# Patient Record
Sex: MALE | Race: WHITE | NOT HISPANIC OR LATINO | Employment: FULL TIME | URBAN - METROPOLITAN AREA
[De-identification: names, ages, dates, MRNs, and addresses within clinical notes are randomized per-mention and may not be internally consistent; named-entity substitution may affect disease eponyms.]

---

## 2018-07-10 ENCOUNTER — APPOINTMENT (OUTPATIENT)
Dept: RADIOLOGY | Facility: MEDICAL CENTER | Age: 43
End: 2018-07-10
Attending: EMERGENCY MEDICINE
Payer: COMMERCIAL

## 2018-07-10 ENCOUNTER — HOSPITAL ENCOUNTER (EMERGENCY)
Facility: MEDICAL CENTER | Age: 43
End: 2018-07-10
Attending: EMERGENCY MEDICINE
Payer: COMMERCIAL

## 2018-07-10 VITALS
BODY MASS INDEX: 29.84 KG/M2 | DIASTOLIC BLOOD PRESSURE: 74 MMHG | HEART RATE: 76 BPM | HEIGHT: 75 IN | OXYGEN SATURATION: 94 % | SYSTOLIC BLOOD PRESSURE: 162 MMHG | RESPIRATION RATE: 17 BRPM | WEIGHT: 240 LBS

## 2018-07-10 DIAGNOSIS — I51.7 LVH (LEFT VENTRICULAR HYPERTROPHY): ICD-10-CM

## 2018-07-10 DIAGNOSIS — R20.2 TINGLING OF FACE: ICD-10-CM

## 2018-07-10 LAB
ALBUMIN SERPL BCP-MCNC: 5.4 G/DL (ref 3.2–4.9)
ALBUMIN/GLOB SERPL: 3.2 G/DL
ALP SERPL-CCNC: 42 U/L (ref 30–99)
ALT SERPL-CCNC: 19 U/L (ref 2–50)
ANION GAP SERPL CALC-SCNC: 14 MMOL/L (ref 0–11.9)
APTT PPP: 22.6 SEC (ref 24.7–36)
AST SERPL-CCNC: 29 U/L (ref 12–45)
BASOPHILS # BLD AUTO: 0.4 % (ref 0–1.8)
BASOPHILS # BLD: 0.04 K/UL (ref 0–0.12)
BILIRUB SERPL-MCNC: 0.4 MG/DL (ref 0.1–1.5)
BUN SERPL-MCNC: 19 MG/DL (ref 8–22)
CALCIUM SERPL-MCNC: 9 MG/DL (ref 8.5–10.5)
CHLORIDE SERPL-SCNC: 104 MMOL/L (ref 96–112)
CO2 SERPL-SCNC: 19 MMOL/L (ref 20–33)
CREAT SERPL-MCNC: 1.3 MG/DL (ref 0.5–1.4)
EKG IMPRESSION: NORMAL
EOSINOPHIL # BLD AUTO: 0.09 K/UL (ref 0–0.51)
EOSINOPHIL NFR BLD: 1 % (ref 0–6.9)
ERYTHROCYTE [DISTWIDTH] IN BLOOD BY AUTOMATED COUNT: 42.3 FL (ref 35.9–50)
GLOBULIN SER CALC-MCNC: 1.7 G/DL (ref 1.9–3.5)
GLUCOSE SERPL-MCNC: 141 MG/DL (ref 65–99)
HCT VFR BLD AUTO: 45 % (ref 42–52)
HGB BLD-MCNC: 15.5 G/DL (ref 14–18)
IMM GRANULOCYTES # BLD AUTO: 0.06 K/UL (ref 0–0.11)
IMM GRANULOCYTES NFR BLD AUTO: 0.7 % (ref 0–0.9)
INR PPP: 0.94 (ref 0.87–1.13)
LYMPHOCYTES # BLD AUTO: 3.65 K/UL (ref 1–4.8)
LYMPHOCYTES NFR BLD: 40.9 % (ref 22–41)
MCH RBC QN AUTO: 32 PG (ref 27–33)
MCHC RBC AUTO-ENTMCNC: 34.4 G/DL (ref 33.7–35.3)
MCV RBC AUTO: 93 FL (ref 81.4–97.8)
MONOCYTES # BLD AUTO: 0.8 K/UL (ref 0–0.85)
MONOCYTES NFR BLD AUTO: 9 % (ref 0–13.4)
NEUTROPHILS # BLD AUTO: 4.28 K/UL (ref 1.82–7.42)
NEUTROPHILS NFR BLD: 48 % (ref 44–72)
NRBC # BLD AUTO: 0 K/UL
NRBC BLD-RTO: 0 /100 WBC
PLATELET # BLD AUTO: 232 K/UL (ref 164–446)
PMV BLD AUTO: 10.6 FL (ref 9–12.9)
POTASSIUM SERPL-SCNC: 3.5 MMOL/L (ref 3.6–5.5)
PROT SERPL-MCNC: 7.1 G/DL (ref 6–8.2)
PROTHROMBIN TIME: 12.3 SEC (ref 12–14.6)
RBC # BLD AUTO: 4.84 M/UL (ref 4.7–6.1)
SODIUM SERPL-SCNC: 137 MMOL/L (ref 135–145)
TROPONIN I SERPL-MCNC: <0.01 NG/ML (ref 0–0.04)
TSH SERPL DL<=0.005 MIU/L-ACNC: 1.59 UIU/ML (ref 0.38–5.33)
WBC # BLD AUTO: 8.9 K/UL (ref 4.8–10.8)

## 2018-07-10 PROCEDURE — 84484 ASSAY OF TROPONIN QUANT: CPT

## 2018-07-10 PROCEDURE — 700105 HCHG RX REV CODE 258: Performed by: EMERGENCY MEDICINE

## 2018-07-10 PROCEDURE — 85730 THROMBOPLASTIN TIME PARTIAL: CPT

## 2018-07-10 PROCEDURE — 74175 CTA ABDOMEN W/CONTRAST: CPT

## 2018-07-10 PROCEDURE — 700117 HCHG RX CONTRAST REV CODE 255: Performed by: EMERGENCY MEDICINE

## 2018-07-10 PROCEDURE — 85025 COMPLETE CBC W/AUTO DIFF WBC: CPT

## 2018-07-10 PROCEDURE — 99284 EMERGENCY DEPT VISIT MOD MDM: CPT

## 2018-07-10 PROCEDURE — 84443 ASSAY THYROID STIM HORMONE: CPT

## 2018-07-10 PROCEDURE — 93005 ELECTROCARDIOGRAM TRACING: CPT

## 2018-07-10 PROCEDURE — 85610 PROTHROMBIN TIME: CPT

## 2018-07-10 PROCEDURE — 80053 COMPREHEN METABOLIC PANEL: CPT

## 2018-07-10 PROCEDURE — 93005 ELECTROCARDIOGRAM TRACING: CPT | Performed by: EMERGENCY MEDICINE

## 2018-07-10 PROCEDURE — 96360 HYDRATION IV INFUSION INIT: CPT

## 2018-07-10 RX ORDER — SODIUM CHLORIDE 9 MG/ML
1000 INJECTION, SOLUTION INTRAVENOUS ONCE
Status: COMPLETED | OUTPATIENT
Start: 2018-07-10 | End: 2018-07-10

## 2018-07-10 RX ADMIN — SODIUM CHLORIDE 1000 ML: 9 INJECTION, SOLUTION INTRAVENOUS at 16:34

## 2018-07-10 RX ADMIN — IOHEXOL 100 ML: 350 INJECTION, SOLUTION INTRAVENOUS at 17:12

## 2018-07-10 NOTE — ED NOTES
Pt very anxious about his situation, reporting he doesn't feel safe alone, and asking for someone to sit with him.

## 2018-07-10 NOTE — ED NOTES
Pt reporting he is too stressed/unable to call his coworker to notify them that he wont be able to make it to an important meeting. RN offered and completed notification per pt request. Pt further began requesting staff to make multiple other calls. RN retrieved/ ensured pt had a phone and encouraged him to make those calls himself.

## 2018-07-10 NOTE — ED NOTES
Rounded on pt, answered questions, addressed needs, ensured call light within reach. Provided emotional support for pt.

## 2018-07-10 NOTE — ED NOTES
Chief Complaint   Patient presents with   • Dizziness   • Palpitations   • Tingling     left face and arm     Pt bib ems with above complaints. Pt said he has been having multiple symptoms in past 2-3wks. He was also diagnosed with high cholesterol but had an allergic reaction with both crestor and lipitor.  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 He is scheduled for echocardiogram and ct of chest as well as neurology consult for his symptoms.

## 2018-07-10 NOTE — ED PROVIDER NOTES
"ED Provider Note    CHIEF COMPLAINT  Chief Complaint   Patient presents with   • Dizziness   • Palpitations   • Tingling     left face and arm on/off  2 months       HPI  Ric Whalen is a 43 y.o. male who presents for evaluation of intermittent tingling in the left side of his face and left arm over the past couple months but increasing in frequency and severity, he also describes episodes of palpitations and lightheadedness associated with this.  He has a history of an aortic arch repair 4 years ago.  He denies any weakness or numbness in any of his extremities, he has no neck pain or chest pain or abdominal pain.  No vomiting but does describe some nausea.  No fever.  He has an echocardiogram scheduled and is supposed to be scheduling an appointment with a neurologist but is yet to do so.  He is visiting here from [facility].  He states he was diagnosed with high cholesterol, week ago began taking medication but had an unpleasant reaction, he takes no medications on a regular basis otherwise    REVIEW OF SYSTEMS  Negative for fever, rash, chest pain, abdominal pain, vomiting, diarrhea, headache, focal weakness, focal numbness, back pain. All other systems are negative.     PAST MEDICAL HISTORY  No past medical history on file.    FAMILY HISTORY  No family history on file.    SOCIAL HISTORY  Social History   Substance Use Topics   • Smoking status: Not on file   • Smokeless tobacco: Not on file   • Alcohol use Not on file       SURGICAL HISTORY  No past surgical history on file.    CURRENT MEDICATIONS  I personally reviewed the medication list in the charting documentation.     ALLERGIES  Allergies   Allergen Reactions   • Crestor [Rosuvastatin Calcium]    • Lipitor [Atorvastatin]        MEDICAL RECORD  I have reviewed patient's medical record and pertinent results are listed above.      PHYSICAL EXAM  VITAL SIGNS: BP (!) 162/74   Pulse (!) 103   Resp 15   Ht 1.905 m (6' 3\")   Wt 108.9 kg (240 lb)   " SpO2 98%   BMI 30.00 kg/m²    Constitutional: Anxious appearing otherwise no acute distress and nontoxic  HENT: Mucus membranes dry.    Eyes: No scleral icterus. Normal conjunctiva   Neck: Supple, comfortable, nonpainful range of motion.   Cardiovascular: Regular heart rate and rhythm.   Thorax & Lungs: Chest is nontender.  Lungs are clear to auscultation with good air movement bilaterally.  No wheeze, rhonchi, nor rales.   Abdomen: Soft, with no tenderness, rebound nor guarding.  No mass or pulsatile mass appreciated.  Skin: Warm, dry. No rash appreciated  Extremities/Musculoskeletal: No sign of trauma. No asymmetric calf tenderness, erythema or edema. Normal range of motion   Neurologic: AAOx4, Cranial nerves II-XII grossly intact, PERRLA, EOMI, speech is normal, normal and symmetric motor and sensory functions upper and lower extremities bilaterally.  Psychiatric: Anxious    DIAGNOSTIC STUDIES / PROCEDURES    EKG  12 Lead EKG interpreted by me to show:    Rate 97  Rhythm: Normal sinus rhythm  Axis: Normal  AL and QRS Intervals: Normal  T waves: No acute changes  ST segments: Nonspecific morphology lead II, V3, V4 and V5, depression  Ectopy: None.    My impression of this EKG: Likely related to LVH      LABS  Results for orders placed or performed during the hospital encounter of 07/10/18   CBC w/ Differential   Result Value Ref Range    WBC 8.9 4.8 - 10.8 K/uL    RBC 4.84 4.70 - 6.10 M/uL    Hemoglobin 15.5 14.0 - 18.0 g/dL    Hematocrit 45.0 42.0 - 52.0 %    MCV 93.0 81.4 - 97.8 fL    MCH 32.0 27.0 - 33.0 pg    MCHC 34.4 33.7 - 35.3 g/dL    RDW 42.3 35.9 - 50.0 fL    Platelet Count 232 164 - 446 K/uL    MPV 10.6 9.0 - 12.9 fL    Neutrophils-Polys 48.00 44.00 - 72.00 %    Lymphocytes 40.90 22.00 - 41.00 %    Monocytes 9.00 0.00 - 13.40 %    Eosinophils 1.00 0.00 - 6.90 %    Basophils 0.40 0.00 - 1.80 %    Immature Granulocytes 0.70 0.00 - 0.90 %    Nucleated RBC 0.00 /100 WBC    Neutrophils (Absolute) 4.28 1.82  - 7.42 K/uL    Lymphs (Absolute) 3.65 1.00 - 4.80 K/uL    Monos (Absolute) 0.80 0.00 - 0.85 K/uL    Eos (Absolute) 0.09 0.00 - 0.51 K/uL    Baso (Absolute) 0.04 0.00 - 0.12 K/uL    Immature Granulocytes (abs) 0.06 0.00 - 0.11 K/uL    NRBC (Absolute) 0.00 K/uL   Complete Metabolic Panel (CMP)   Result Value Ref Range    Sodium 137 135 - 145 mmol/L    Potassium 3.5 (L) 3.6 - 5.5 mmol/L    Chloride 104 96 - 112 mmol/L    Co2 19 (L) 20 - 33 mmol/L    Anion Gap 14.0 (H) 0.0 - 11.9    Glucose 141 (H) 65 - 99 mg/dL    Bun 19 8 - 22 mg/dL    Creatinine 1.30 0.50 - 1.40 mg/dL    Calcium 9.0 8.5 - 10.5 mg/dL    AST(SGOT) 29 12 - 45 U/L    ALT(SGPT) 19 2 - 50 U/L    Alkaline Phosphatase 42 30 - 99 U/L    Total Bilirubin 0.4 0.1 - 1.5 mg/dL    Albumin 5.4 (H) 3.2 - 4.9 g/dL    Total Protein 7.1 6.0 - 8.2 g/dL    Globulin 1.7 (L) 1.9 - 3.5 g/dL    A-G Ratio 3.2 g/dL   Troponin STAT   Result Value Ref Range    Troponin I <0.01 0.00 - 0.04 ng/mL   PT/INR   Result Value Ref Range    PT 12.3 12.0 - 14.6 sec    INR 0.94 0.87 - 1.13   APTT   Result Value Ref Range    APTT 22.6 (L) 24.7 - 36.0 sec   TSH (for screening thyroid dysfunction)   Result Value Ref Range    TSH 1.590 0.380 - 5.330 uIU/mL   ESTIMATED GFR   Result Value Ref Range    GFR If African American >60 >60 mL/min/1.73 m 2    GFR If Non African American >60 >60 mL/min/1.73 m 2   EKG (ER)   Result Value Ref Range    Report       [facility] Emergency Dept.    Test Date:  2018-07-10  Pt Name:    NASRA FRENCH                  Department: ER  MRN:        [id number]                      Room:       [facility]  Gender:     Male                         Technician: [id number]  :        1975                   Requested By:ER TRIAGE PROTOCOL  Order #:    [id number]                    Reading MD:    Measurements  Intervals                                Axis  Rate:       97                           P:          53  HI:         132                          QRS:         4  QRSD:       100                          T:  QT:         368  QTc:        468    Interpretive Statements  SINUS RHYTHM  PROBABLE LEFT ATRIAL ABNORMALITY  RSR' IN V1 OR V2, RIGHT VCD OR RVH  REPOL ABNRM SUGGESTS ISCHEMIA, DIFFUSE LEADS  No previous ECG available for comparison          RADIOLOGY  CT-CTA COMPLETE THORACOABDOMINAL AORTA   Final Result      1.  No evidence of thoracic or abdominal aortic aneurysm or dissection.      2.  Right-sided aortic arch, a normal variant.      3.  No acute inflammatory process identified in the chest or abdomen.               COURSE & MEDICAL DECISION MAKING  I have reviewed any medical record information, laboratory studies and radiographic results as noted above.  Differential diagnoses includes: Aortic injury, ACS, dehydration, electrolyte abnormality, anemia, thyroid disorder, anxiety reaction    Encounter Summary: This is a 43 y.o. male with episodes of left-sided facial tingling with left arm tingling over the past couple months, becoming progressively severe, history of an aortic arch repair, he has no chest pain, no weakness or numbness.  The patient is anxious appearing on exam and he also seems dehydrated.  His EKG is concerning for ischemia although I have no old for comparison.  The patient takes no meds on a regular basis, diagnosed only with high cholesterol.  Will check troponin and other blood work, a CT of his aorta given his vague neurologic complaints associated with palpitations and his history of aortic arch repair.  Will administer IV fluids as he looks dehydrated and then will reevaluate ------ after the IV fluids, the patient informs me that he is feeling improved, his heart rate has improved, his blood work is largely unremarkable except for a minimal decrease in the CO2 and elevation in the anion gap which I think could be related to dehydration.  The CT of his chest reveals no acute abnormalities, at this time I do not feel as though emergent  "neurologic consultation is needed, the patient has already been informed of the need of neurology evaluation and, although he has yet to make an appointment, he expresses understanding of the need to have this evaluation performed.  The patient's EKG is abnormal and I discussed this with our on-call cardiologist Dr. Wills who suspects is all related to LVH and recommends an outpatient echocardiogram which the patient Colby has scheduled for Friday.  At this point strict return instructions have been discussed, he is stable and appropriate for discharge  BP (!) 162/74   Pulse 76   Resp 17   Ht 1.905 m (6' 3\")   Wt 108.9 kg (240 lb)   SpO2 94%   BMI 30.00 kg/m²         DISPOSITION: Discharged home in stable condition      FINAL IMPRESSION  1. Tingling of face    2. LVH (left ventricular hypertrophy)           This dictation was created using voice recognition software. The accuracy of the dictation is limited to the abilities of the software. I expect there may be some errors of grammar and possibly content. The nursing notes were reviewed and certain aspects of this information were incorporated into this note.    Electronically signed by: Gopal López, 7/10/2018 4:05 PM      "

## 2018-07-11 ENCOUNTER — APPOINTMENT (OUTPATIENT)
Dept: RADIOLOGY | Facility: MEDICAL CENTER | Age: 43
End: 2018-07-11
Attending: INTERNAL MEDICINE
Payer: COMMERCIAL

## 2018-07-11 ENCOUNTER — APPOINTMENT (OUTPATIENT)
Dept: RADIOLOGY | Facility: MEDICAL CENTER | Age: 43
End: 2018-07-11
Attending: EMERGENCY MEDICINE
Payer: COMMERCIAL

## 2018-07-11 ENCOUNTER — HOSPITAL ENCOUNTER (OUTPATIENT)
Facility: MEDICAL CENTER | Age: 43
End: 2018-07-12
Attending: EMERGENCY MEDICINE | Admitting: INTERNAL MEDICINE
Payer: COMMERCIAL

## 2018-07-11 DIAGNOSIS — G45.9 TRANSIENT CEREBRAL ISCHEMIA, UNSPECIFIED TYPE: ICD-10-CM

## 2018-07-11 PROBLEM — R07.9 CHEST PAIN: Status: ACTIVE | Noted: 2018-07-11

## 2018-07-11 PROBLEM — R29.810 FACIAL WEAKNESS: Status: ACTIVE | Noted: 2018-07-11

## 2018-07-11 LAB
ALBUMIN SERPL BCP-MCNC: 4.4 G/DL (ref 3.2–4.9)
ALBUMIN/GLOB SERPL: 1.5 G/DL
ALP SERPL-CCNC: 43 U/L (ref 30–99)
ALT SERPL-CCNC: 21 U/L (ref 2–50)
ANION GAP SERPL CALC-SCNC: 7 MMOL/L (ref 0–11.9)
APPEARANCE UR: CLEAR
AST SERPL-CCNC: 21 U/L (ref 12–45)
BASOPHILS # BLD AUTO: 0.5 % (ref 0–1.8)
BASOPHILS # BLD: 0.03 K/UL (ref 0–0.12)
BILIRUB SERPL-MCNC: 0.4 MG/DL (ref 0.1–1.5)
BILIRUB UR QL STRIP.AUTO: NEGATIVE
BUN SERPL-MCNC: 15 MG/DL (ref 8–22)
CALCIUM SERPL-MCNC: 9.4 MG/DL (ref 8.5–10.5)
CHLORIDE SERPL-SCNC: 108 MMOL/L (ref 96–112)
CO2 SERPL-SCNC: 26 MMOL/L (ref 20–33)
COLOR UR: YELLOW
CREAT SERPL-MCNC: 1.13 MG/DL (ref 0.5–1.4)
EKG IMPRESSION: NORMAL
EOSINOPHIL # BLD AUTO: 0.01 K/UL (ref 0–0.51)
EOSINOPHIL NFR BLD: 0.2 % (ref 0–6.9)
ERYTHROCYTE [DISTWIDTH] IN BLOOD BY AUTOMATED COUNT: 42.5 FL (ref 35.9–50)
EST. AVERAGE GLUCOSE BLD GHB EST-MCNC: 114 MG/DL
GLOBULIN SER CALC-MCNC: 2.9 G/DL (ref 1.9–3.5)
GLUCOSE SERPL-MCNC: 98 MG/DL (ref 65–99)
GLUCOSE UR STRIP.AUTO-MCNC: NEGATIVE MG/DL
HBA1C MFR BLD: 5.6 % (ref 0–5.6)
HCT VFR BLD AUTO: 42.8 % (ref 42–52)
HGB BLD-MCNC: 14.7 G/DL (ref 14–18)
IMM GRANULOCYTES # BLD AUTO: 0.01 K/UL (ref 0–0.11)
IMM GRANULOCYTES NFR BLD AUTO: 0.2 % (ref 0–0.9)
KETONES UR STRIP.AUTO-MCNC: NEGATIVE MG/DL
LEUKOCYTE ESTERASE UR QL STRIP.AUTO: NEGATIVE
LYMPHOCYTES # BLD AUTO: 1.69 K/UL (ref 1–4.8)
LYMPHOCYTES NFR BLD: 26.6 % (ref 22–41)
MCH RBC QN AUTO: 31.7 PG (ref 27–33)
MCHC RBC AUTO-ENTMCNC: 34.3 G/DL (ref 33.7–35.3)
MCV RBC AUTO: 92.2 FL (ref 81.4–97.8)
MICRO URNS: NORMAL
MONOCYTES # BLD AUTO: 0.33 K/UL (ref 0–0.85)
MONOCYTES NFR BLD AUTO: 5.2 % (ref 0–13.4)
NEUTROPHILS # BLD AUTO: 4.28 K/UL (ref 1.82–7.42)
NEUTROPHILS NFR BLD: 67.3 % (ref 44–72)
NITRITE UR QL STRIP.AUTO: NEGATIVE
NRBC # BLD AUTO: 0 K/UL
NRBC BLD-RTO: 0 /100 WBC
PH UR STRIP.AUTO: 8 [PH]
PLATELET # BLD AUTO: 210 K/UL (ref 164–446)
PMV BLD AUTO: 10.2 FL (ref 9–12.9)
POTASSIUM SERPL-SCNC: 4.2 MMOL/L (ref 3.6–5.5)
PROT SERPL-MCNC: 7.3 G/DL (ref 6–8.2)
PROT UR QL STRIP: NEGATIVE MG/DL
RBC # BLD AUTO: 4.64 M/UL (ref 4.7–6.1)
RBC UR QL AUTO: NEGATIVE
SODIUM SERPL-SCNC: 141 MMOL/L (ref 135–145)
SP GR UR STRIP.AUTO: 1.01
TROPONIN I SERPL-MCNC: <0.01 NG/ML (ref 0–0.04)
UROBILINOGEN UR STRIP.AUTO-MCNC: 0.2 MG/DL
WBC # BLD AUTO: 6.4 K/UL (ref 4.8–10.8)

## 2018-07-11 PROCEDURE — 700111 HCHG RX REV CODE 636 W/ 250 OVERRIDE (IP): Performed by: EMERGENCY MEDICINE

## 2018-07-11 PROCEDURE — 85025 COMPLETE CBC W/AUTO DIFF WBC: CPT

## 2018-07-11 PROCEDURE — 83036 HEMOGLOBIN GLYCOSYLATED A1C: CPT

## 2018-07-11 PROCEDURE — 84484 ASSAY OF TROPONIN QUANT: CPT

## 2018-07-11 PROCEDURE — 700102 HCHG RX REV CODE 250 W/ 637 OVERRIDE(OP): Performed by: FAMILY MEDICINE

## 2018-07-11 PROCEDURE — 71046 X-RAY EXAM CHEST 2 VIEWS: CPT

## 2018-07-11 PROCEDURE — G0378 HOSPITAL OBSERVATION PER HR: HCPCS

## 2018-07-11 PROCEDURE — 81003 URINALYSIS AUTO W/O SCOPE: CPT

## 2018-07-11 PROCEDURE — 70450 CT HEAD/BRAIN W/O DYE: CPT

## 2018-07-11 PROCEDURE — 93005 ELECTROCARDIOGRAM TRACING: CPT | Performed by: EMERGENCY MEDICINE

## 2018-07-11 PROCEDURE — 700102 HCHG RX REV CODE 250 W/ 637 OVERRIDE(OP): Performed by: INTERNAL MEDICINE

## 2018-07-11 PROCEDURE — 80053 COMPREHEN METABOLIC PANEL: CPT

## 2018-07-11 PROCEDURE — 96374 THER/PROPH/DIAG INJ IV PUSH: CPT

## 2018-07-11 PROCEDURE — 700105 HCHG RX REV CODE 258: Performed by: INTERNAL MEDICINE

## 2018-07-11 PROCEDURE — A9270 NON-COVERED ITEM OR SERVICE: HCPCS | Performed by: FAMILY MEDICINE

## 2018-07-11 PROCEDURE — 70551 MRI BRAIN STEM W/O DYE: CPT

## 2018-07-11 PROCEDURE — 99285 EMERGENCY DEPT VISIT HI MDM: CPT

## 2018-07-11 PROCEDURE — 93005 ELECTROCARDIOGRAM TRACING: CPT

## 2018-07-11 PROCEDURE — A9270 NON-COVERED ITEM OR SERVICE: HCPCS | Performed by: INTERNAL MEDICINE

## 2018-07-11 PROCEDURE — 99220 PR INITIAL OBSERVATION CARE,LEVL III: CPT | Performed by: INTERNAL MEDICINE

## 2018-07-11 RX ORDER — LORAZEPAM 1 MG/1
0.25 TABLET ORAL ONCE
Status: COMPLETED | OUTPATIENT
Start: 2018-07-11 | End: 2018-07-11

## 2018-07-11 RX ORDER — LORAZEPAM 2 MG/ML
1 INJECTION INTRAMUSCULAR ONCE
Status: COMPLETED | OUTPATIENT
Start: 2018-07-11 | End: 2018-07-11

## 2018-07-11 RX ORDER — PROMETHAZINE HYDROCHLORIDE 25 MG/1
12.5-25 TABLET ORAL EVERY 4 HOURS PRN
Status: DISCONTINUED | OUTPATIENT
Start: 2018-07-11 | End: 2018-07-12 | Stop reason: HOSPADM

## 2018-07-11 RX ORDER — PROMETHAZINE HYDROCHLORIDE 12.5 MG/1
12.5-25 SUPPOSITORY RECTAL EVERY 4 HOURS PRN
Status: DISCONTINUED | OUTPATIENT
Start: 2018-07-11 | End: 2018-07-12 | Stop reason: HOSPADM

## 2018-07-11 RX ORDER — ONDANSETRON 2 MG/ML
4 INJECTION INTRAMUSCULAR; INTRAVENOUS EVERY 4 HOURS PRN
Status: DISCONTINUED | OUTPATIENT
Start: 2018-07-11 | End: 2018-07-12 | Stop reason: HOSPADM

## 2018-07-11 RX ORDER — ASPIRIN 325 MG
325 TABLET ORAL DAILY
Status: DISCONTINUED | OUTPATIENT
Start: 2018-07-11 | End: 2018-07-12 | Stop reason: HOSPADM

## 2018-07-11 RX ORDER — BISACODYL 10 MG
10 SUPPOSITORY, RECTAL RECTAL
Status: DISCONTINUED | OUTPATIENT
Start: 2018-07-11 | End: 2018-07-12 | Stop reason: HOSPADM

## 2018-07-11 RX ORDER — AZELASTINE HYDROCHLORIDE, FLUTICASONE PROPIONATE 137; 50 UG/1; UG/1
1 SPRAY, METERED NASAL DAILY
COMMUNITY

## 2018-07-11 RX ORDER — ACETAMINOPHEN 325 MG/1
650 TABLET ORAL EVERY 6 HOURS PRN
Status: DISCONTINUED | OUTPATIENT
Start: 2018-07-11 | End: 2018-07-12 | Stop reason: HOSPADM

## 2018-07-11 RX ORDER — ASPIRIN 300 MG/1
300 SUPPOSITORY RECTAL DAILY
Status: DISCONTINUED | OUTPATIENT
Start: 2018-07-11 | End: 2018-07-12 | Stop reason: HOSPADM

## 2018-07-11 RX ORDER — AMOXICILLIN 250 MG
2 CAPSULE ORAL 2 TIMES DAILY
Status: DISCONTINUED | OUTPATIENT
Start: 2018-07-11 | End: 2018-07-12 | Stop reason: HOSPADM

## 2018-07-11 RX ORDER — ASPIRIN 81 MG/1
324 TABLET, CHEWABLE ORAL DAILY
Status: DISCONTINUED | OUTPATIENT
Start: 2018-07-11 | End: 2018-07-12 | Stop reason: HOSPADM

## 2018-07-11 RX ORDER — POLYETHYLENE GLYCOL 3350 17 G/17G
1 POWDER, FOR SOLUTION ORAL
Status: DISCONTINUED | OUTPATIENT
Start: 2018-07-11 | End: 2018-07-12 | Stop reason: HOSPADM

## 2018-07-11 RX ORDER — ONDANSETRON 4 MG/1
4 TABLET, ORALLY DISINTEGRATING ORAL EVERY 4 HOURS PRN
Status: DISCONTINUED | OUTPATIENT
Start: 2018-07-11 | End: 2018-07-12 | Stop reason: HOSPADM

## 2018-07-11 RX ORDER — SODIUM CHLORIDE 9 MG/ML
INJECTION, SOLUTION INTRAVENOUS CONTINUOUS
Status: DISCONTINUED | OUTPATIENT
Start: 2018-07-11 | End: 2018-07-12 | Stop reason: HOSPADM

## 2018-07-11 RX ADMIN — LORAZEPAM 1 MG: 2 INJECTION INTRAMUSCULAR; INTRAVENOUS at 13:15

## 2018-07-11 RX ADMIN — SODIUM CHLORIDE: 9 INJECTION, SOLUTION INTRAVENOUS at 15:15

## 2018-07-11 RX ADMIN — SODIUM CHLORIDE: 9 INJECTION, SOLUTION INTRAVENOUS at 18:28

## 2018-07-11 RX ADMIN — ASPIRIN 325 MG: 325 TABLET ORAL at 18:29

## 2018-07-11 RX ADMIN — LORAZEPAM 0.25 MG: 1 TABLET ORAL at 23:04

## 2018-07-11 RX ADMIN — STANDARDIZED SENNA CONCENTRATE AND DOCUSATE SODIUM 2 TABLET: 8.6; 5 TABLET, FILM COATED ORAL at 18:29

## 2018-07-11 RX ADMIN — LORAZEPAM 0.25 MG: 1 TABLET ORAL at 18:29

## 2018-07-11 ASSESSMENT — ENCOUNTER SYMPTOMS
CHILLS: 0
ABDOMINAL PAIN: 0
BLURRED VISION: 0
BACK PAIN: 0
MYALGIAS: 0
PALPITATIONS: 0
HEADACHES: 0
EYE DISCHARGE: 0
SHORTNESS OF BREATH: 0
DEPRESSION: 0
NAUSEA: 0
NECK PAIN: 0
COUGH: 0
TINGLING: 1
NERVOUS/ANXIOUS: 0
FEVER: 0
STRIDOR: 0
SPUTUM PRODUCTION: 0
ORTHOPNEA: 0
EYE REDNESS: 0
EYE PAIN: 0
HEARTBURN: 0
WEIGHT LOSS: 0
FOCAL WEAKNESS: 1
SEIZURES: 0
INSOMNIA: 0
VOMITING: 0
DIARRHEA: 0
DIZZINESS: 0

## 2018-07-11 ASSESSMENT — LIFESTYLE VARIABLES
HOW MANY TIMES IN THE PAST YEAR HAVE YOU HAD 5 OR MORE DRINKS IN A DAY: 0
TOTAL SCORE: 0
ALCOHOL_USE: NO
TOTAL SCORE: 0
EVER FELT BAD OR GUILTY ABOUT YOUR DRINKING: NO
HAVE YOU EVER FELT YOU SHOULD CUT DOWN ON YOUR DRINKING: NO
HAVE PEOPLE ANNOYED YOU BY CRITICIZING YOUR DRINKING: NO
TOTAL SCORE: 0
HAVE YOU EVER FELT YOU SHOULD CUT DOWN ON YOUR DRINKING: NO
CONSUMPTION TOTAL: INCOMPLETE
EVER HAD A DRINK FIRST THING IN THE MORNING TO STEADY YOUR NERVES TO GET RID OF A HANGOVER: NO
CONSUMPTION TOTAL: INCOMPLETE
HAVE PEOPLE ANNOYED YOU BY CRITICIZING YOUR DRINKING: NO
AVERAGE NUMBER OF DAYS PER WEEK YOU HAVE A DRINK CONTAINING ALCOHOL: 1
ALCOHOL_USE: YES
EVER FELT BAD OR GUILTY ABOUT YOUR DRINKING: NO
EVER_SMOKED: NEVER
TOTAL SCORE: 0
EVER HAD A DRINK FIRST THING IN THE MORNING TO STEADY YOUR NERVES TO GET RID OF A HANGOVER: NO
ON A TYPICAL DAY WHEN YOU DRINK ALCOHOL HOW MANY DRINKS DO YOU HAVE: 1
CONSUMPTION TOTAL: INCOMPLETE
TOTAL SCORE: 0
TOTAL SCORE: 0

## 2018-07-11 ASSESSMENT — PATIENT HEALTH QUESTIONNAIRE - PHQ9
2. FEELING DOWN, DEPRESSED, IRRITABLE, OR HOPELESS: NOT AT ALL
1. LITTLE INTEREST OR PLEASURE IN DOING THINGS: NOT AT ALL
SUM OF ALL RESPONSES TO PHQ9 QUESTIONS 1 AND 2: 0

## 2018-07-11 ASSESSMENT — PAIN SCALES - GENERAL
PAINLEVEL_OUTOF10: 0

## 2018-07-11 NOTE — ED PROVIDER NOTES
ED Provider Note    CHIEF COMPLAINT  Chief Complaint   Patient presents with   • Anxiety     pt. states he came in yesterday for chest pain. Pt. states yesterday he was removed from a flight for MI like symptoms. Pt. states he got on a plane again this morning and again removed himself from the plane due to similar s/s. Pt. states he feels like his heart is racing. Pt's HR is in the 90s consistently.       HPI  Ric Whalen is a 43 y.o. male here for evaluation of left-sided facial paresthesia and left arm paresthesia.  The patient states that he was here yesterday and had a workup for chest pain, which includes a CT of the chest.  He has recently been traveling on a plane, and had some chest pain yesterday.  This morning he woke up with left-sided facial numbness and arm numbness as well.  He states that this went away after couple of hours, but decided to come in here to be evaluated.  He has no trouble walking, no vomiting, and no fever.  He has no current chest pain and no shortness of breath.    PAST MEDICAL HISTORY   No diabetes  No hyperlipidemia    SOCIAL HISTORY  Social History     Social History Main Topics   • Smoking status: Never Smoker   • Smokeless tobacco: Never Used   • Alcohol use Yes   • Drug use: No   • Sexual activity: Not on file       SURGICAL HISTORY  patient denies any surgical history    CURRENT MEDICATIONS  Home Medications     Reviewed by Kelsie Lam R.N. (Registered Nurse) on 07/11/18 at 0759  Med List Status: Partial   Medication Last Dose Status        Patient José Miguel Taking any Medications                       ALLERGIES  Allergies   Allergen Reactions   • Crestor [Rosuvastatin Calcium]    • Lipitor [Atorvastatin]        REVIEW OF SYSTEMS  See HPI for further details. Review of systems as above, otherwise all other systems are negative.     PHYSICAL EXAM  Constitutional: Well developed, well nourished. No acute distress.  HEENT: Normocephalic, atraumatic. Posterior pharynx clear  and moist.  Eyes:  EOMI. Normal sclera.  Neck: Supple, Full range of motion, nontender.  Chest/Pulmonary: clear to ausculation. Symmetrical expansion.   Cardio: Regular rate and rhythm with no murmur.   Abdomen: Soft, nontender. No peritoneal signs. No guarding. No palpable masses.  Back: No CVA tenderness, nontender midline, no step offs.  Musculoskeletal: No deformity, no edema, neurovascular intact.   Neuro: Clear speech, appropriate, cooperative, cranial nerves II-XII grossly intact.  Diminished sensation to left face and left upper extremity.  Psych: Normal mood and affect    Results for orders placed or performed during the hospital encounter of 18   CBC WITH DIFFERENTIAL   Result Value Ref Range    WBC 6.4 4.8 - 10.8 K/uL    RBC 4.64 (L) 4.70 - 6.10 M/uL    Hemoglobin 14.7 14.0 - 18.0 g/dL    Hematocrit 42.8 42.0 - 52.0 %    MCV 92.2 81.4 - 97.8 fL    MCH 31.7 27.0 - 33.0 pg    MCHC 34.3 33.7 - 35.3 g/dL    RDW 42.5 35.9 - 50.0 fL    Platelet Count 210 164 - 446 K/uL    MPV 10.2 9.0 - 12.9 fL    Neutrophils-Polys 67.30 44.00 - 72.00 %    Lymphocytes 26.60 22.00 - 41.00 %    Monocytes 5.20 0.00 - 13.40 %    Eosinophils 0.20 0.00 - 6.90 %    Basophils 0.50 0.00 - 1.80 %    Immature Granulocytes 0.20 0.00 - 0.90 %    Nucleated RBC 0.00 /100 WBC    Neutrophils (Absolute) 4.28 1.82 - 7.42 K/uL    Lymphs (Absolute) 1.69 1.00 - 4.80 K/uL    Monos (Absolute) 0.33 0.00 - 0.85 K/uL    Eos (Absolute) 0.01 0.00 - 0.51 K/uL    Baso (Absolute) 0.03 0.00 - 0.12 K/uL    Immature Granulocytes (abs) 0.01 0.00 - 0.11 K/uL    NRBC (Absolute) 0.00 K/uL   EKG (NOW)   Result Value Ref Range    Report       [facility] Emergency Dept.    Test Date:  2018  Pt Name:    NASRA FRENCH                  Department: ER  MRN:        [id number]                      Room:  Gender:     Male                         Technician: [id number]  :        1975                   Requested By:ER TRIAGE PROTOCOL  Order #:     [id number]                    Reading MD:    Measurements  Intervals                                Axis  Rate:       74                           P:          62  OR:         156                          QRS:        38  QRSD:       102                          T:          -27  QT:         396  QTc:        440    Interpretive Statements  SINUS RHYTHM  PROBABLE LEFT ATRIAL ABNORMALITY  RSR' IN V1 OR V2, PROBABLY NORMAL VARIANT  LVH WITH SECONDARY REPOLARIZATION ABNORMALITY  Compared to ECG 07/10/2018 15:29:48  Left ventricular hypertrophy now present  Right ventricular hypertrophy no longer present  Possible ischemia no longer present          CT-HEAD W/O   Final Result      No acute intracranial abnormality.      DX-CHEST-2 VIEWS    (Results Pending)   MR-BRAIN-W/O    (Results Pending)         PROCEDURES     MEDICAL RECORD  I have reviewed patient's medical record and pertinent results are listed above.    COURSE & MEDICAL DECISION MAKING  I have reviewed any medical record information, laboratory studies and radiographic results as noted above.    Differential diagnoses include but not limited to: TIA, CVA, MI, anxiety    Dr. Melendez will admit to the cdu.  He will be admitted in guarded condition.     FINAL IMPRESSION  1. Transient cerebral ischemia, unspecified type      Electronically signed by: Ramy Jean, 7/11/2018 1:55 PM

## 2018-07-11 NOTE — ED TRIAGE NOTES
"Ric Whalen  43 y.o. male  Chief Complaint   Patient presents with   • Anxiety     pt. states he came in yesterday for chest pain. Pt. states yesterday he was removed from a flight for MI like symptoms. Pt. states he got on a plane again this morning and again removed himself from the plane due to similar s/s. Pt. states he feels like his heart is racing. Pt's HR is in the 90s consistently.     /90   Pulse 84   Temp 36.6 °C (97.9 °F)   Resp 18   Ht 1.905 m (6' 3\")   Wt 108.9 kg (240 lb)   SpO2 98%   BMI 30.00 kg/m²   Pt amb to triage with steady gait for above complaint. Pt. Placed into wheelchair due to repeated comments of feelings of dizziness.  Pt is alert and oriented, speaking in full sentences, follows commands and responds appropriately to questions. NAD. Resp are even and unlabored.  Pt placed in lobby. Pt educated on triage process. Pt encouraged to alert staff for any changes.  "

## 2018-07-11 NOTE — DISCHARGE PLANNING
MSW provided pt's family with lodging letter and list. MSW also faxed letter to tge [employer]. Will remain available for support.

## 2018-07-11 NOTE — DISCHARGE INSTRUCTIONS
Return immediately to the Emergency Department if you experience continuing or worsening symptoms, any new numbness/weakness/tingling, fever or any other new or worsening symptoms.       You were seen and examined today in our facility. Our caregiver found nothing wrong on the exam. If testing was done such as lab work or x-rays, they did not indicate enough wrong to suggest that treatment should be given. The caregiver then must decide after testing is finished if your concern is a physical problem or illness that needs treatment. Today no treatable problem was found. Even if reassurance was given, if you feel you are getting worse when you get home make sure you call back or return to our department.  For the protection of your privacy, test results can not be given over the phone. Make sure you receive the results of your test. Ask as to how these results are to be obtained if you have not been informed. It is your responsibility to obtain your test results.  Your condition can change over time. Sometimes it takes more than one visit to determine the cause of your symptoms. It is important that you monitor your condition for any changes.  SEEK IMMEDIATE MEDICAL CARE IF:  · You develop an oral temperature above 102° F (38.9° C), which lasts for more than 2 days, not controlled by medications. Only take over-the-counter or prescription medicines for pain, discomfort, or fever as directed by your caregiver.   · You develop a loss of appetite or start throwing up (vomiting).   · You develop a rash, cough, belly (abdominal) pain, earache, headache, or develop pain in neck, muscles, or joints.   · The problem or problems which brought you to our facility become worse or are a cause of more concern.   If we have told you today your exam and tests are normal, and a short while later you feel this is not right, please seek medical attention so you may be rechecked.  Document Released: 04/01/2002 Document Revised:  03/11/2013 Document Reviewed: 07/24/2009  [employer]® Patient Information ©2013 [employer].

## 2018-07-11 NOTE — H&P
Hospital Medicine History and Physical      Date of Service  7/11/2018    Chief Complaint  Chief Complaint   Patient presents with   • Anxiety     pt. states he came in yesterday for chest pain. Pt. states yesterday he was removed from a flight for MI like symptoms. Pt. states he got on a plane again this morning and again removed himself from the plane due to similar s/s. Pt. states he feels like his heart is racing. Pt's HR is in the 90s consistently.       History of Presenting Illness  Marlee is a 43 y.o. male no PMH, who presents with chest pain.  He has cardiac workup done in ER and it was negative.  He also had a CT angiogram of the chest done with no acute finding.  while in the ER he started having left facial weakness, tingling sensation and left upper extremity weakness and tingling sensation.  Denies any slurred speech.  Currently he only have some tingling sensation over left face.  CT scan of the head was done and it was negative.  He will be admitted for observation.    Primary Care Physician  Pcp Not In Computer      Code Status  Full code    Review of Systems  Review of Systems   Constitutional: Negative for chills, fever and weight loss.   HENT: Negative for congestion and nosebleeds.    Eyes: Negative for blurred vision, pain, discharge and redness.   Respiratory: Negative for cough, sputum production, shortness of breath and stridor.    Cardiovascular: Positive for chest pain. Negative for palpitations and orthopnea.   Gastrointestinal: Negative for abdominal pain, diarrhea, heartburn, nausea and vomiting.   Genitourinary: Negative for dysuria, frequency and urgency.   Musculoskeletal: Negative for back pain, myalgias and neck pain.   Skin: Negative for itching and rash.   Neurological: Positive for tingling and focal weakness. Negative for dizziness, seizures and headaches.   Psychiatric/Behavioral: Negative for depression. The patient is not nervous/anxious and does not have insomnia.       Please see HPI, all other systems were reviewed and are negative (AMA/CMS criteria)     Past Medical History  No pertinent past medical history    Surgical History  No pertinent past surgical history    Medications  Not taking any medication  Family History  History reviewed. No pertinent family history.      Social History  Social History   Substance Use Topics   • Smoking status: Never Smoker   • Smokeless tobacco: Never Used   • Alcohol use Yes       Allergies  Allergies   Allergen Reactions   • Crestor [Rosuvastatin Calcium]    • Lipitor [Atorvastatin]         Physical Exam  Laboratory   Hemodynamics  Temp (24hrs), Av.4 °C (97.6 °F), Min:36.3 °C (97.3 °F), Max:36.6 °C (97.9 °F)   Temperature: 36.3 °C (97.3 °F)  Pulse  Av.3  Min: 61  Max: 84 Heart Rate (Monitored): 67  Blood Pressure: 120/64, NIBP: 118/70      Respiratory      Respiration: 16, Pulse Oximetry: 99 %             Physical Exam   Constitutional: He is oriented to person, place, and time. No distress.   HENT:   Head: Normocephalic and atraumatic.   Mouth/Throat: Oropharynx is clear and moist.   Eyes: Conjunctivae and EOM are normal. Pupils are equal, round, and reactive to light.   Neck: Normal range of motion. Neck supple. No tracheal deviation present. No thyromegaly present.   Cardiovascular: Normal rate and regular rhythm.    No murmur heard.  Pulmonary/Chest: Effort normal and breath sounds normal. No respiratory distress. He has no wheezes.   Abdominal: Soft. Bowel sounds are normal. He exhibits no distension. There is no tenderness.   Musculoskeletal: He exhibits no edema or tenderness.   Neurological: He is alert and oriented to person, place, and time. No cranial nerve deficit.   Left facial tingling   Skin: Skin is warm and dry. He is not diaphoretic. No erythema.   Psychiatric: He has a normal mood and affect. His behavior is normal. Thought content normal.       Recent Labs      07/10/18   1530  18   0941   WBC  8.9  6.4    RBC  4.84  4.64*   HEMOGLOBIN  15.5  14.7   HEMATOCRIT  45.0  42.8   MCV  93.0  92.2   MCH  32.0  31.7   MCHC  34.4  34.3   RDW  42.3  42.5   PLATELETCT  232  210   MPV  10.6  10.2     Recent Labs      07/10/18   1530  07/11/18   0941   SODIUM  137  141   POTASSIUM  3.5*  4.2   CHLORIDE  104  108   CO2  19*  26   GLUCOSE  141*  98   BUN  19  15   CREATININE  1.30  1.13   CALCIUM  9.0  9.4     Recent Labs      07/10/18   1530  07/11/18   0941   ALTSGPT  19  21   ASTSGOT  29  21   ALKPHOSPHAT  42  43   TBILIRUBIN  0.4  0.4   GLUCOSE  141*  98     Recent Labs      07/10/18   1530   APTT  22.6*   INR  0.94             Lab Results   Component Value Date    TROPONINI <0.01 07/11/2018       Imaging  CT-HEAD W/O   Final Result      No acute intracranial abnormality.      DX-CHEST-2 VIEWS    (Results Pending)   MR-BRAIN-W/O    (Results Pending)     EKG  per my independant read:  QTc: 468, HR: 97, sinus tachycardia, diffuse st depression     Assessment/Plan     I anticipate this patient is appropriate for observation status at this time.    Facial weakness- (present on admission)   Assessment & Plan    Left facial weakness and tingling sensation  CT scan is negative  Get MRI        Chest pain- (present on admission)   Assessment & Plan    With EKG changes ST depression as above  We will get stress test for him            Prophylaxis:  lovenox

## 2018-07-11 NOTE — ED NOTES
Med Rec completed per patient  Allergies reviewed  No ORAL antibiotics in last 30 days    Patient stated he is allergic to Crestor and stopped 3 days ago

## 2018-07-11 NOTE — ED NOTES
Discussed Discharge instructions, F/U instructions Pt. Questions answered. Pt requests CD of imagining, ERP notified, ERP provided images as requested. Pt escorted out of ED in stable condition.

## 2018-07-12 ENCOUNTER — PATIENT OUTREACH (OUTPATIENT)
Dept: HEALTH INFORMATION MANAGEMENT | Facility: OTHER | Age: 43
End: 2018-07-12

## 2018-07-12 VITALS
SYSTOLIC BLOOD PRESSURE: 137 MMHG | HEART RATE: 62 BPM | TEMPERATURE: 97.1 F | OXYGEN SATURATION: 95 % | WEIGHT: 235.23 LBS | HEIGHT: 75 IN | RESPIRATION RATE: 16 BRPM | DIASTOLIC BLOOD PRESSURE: 65 MMHG | BODY MASS INDEX: 29.25 KG/M2

## 2018-07-12 LAB
ALBUMIN SERPL BCP-MCNC: 3.7 G/DL (ref 3.2–4.9)
ALBUMIN/GLOB SERPL: 1.3 G/DL
ALP SERPL-CCNC: 37 U/L (ref 30–99)
ALT SERPL-CCNC: 17 U/L (ref 2–50)
ANION GAP SERPL CALC-SCNC: 5 MMOL/L (ref 0–11.9)
AST SERPL-CCNC: 23 U/L (ref 12–45)
BILIRUB SERPL-MCNC: 0.3 MG/DL (ref 0.1–1.5)
BUN SERPL-MCNC: 17 MG/DL (ref 8–22)
CALCIUM SERPL-MCNC: 8.6 MG/DL (ref 8.5–10.5)
CHLORIDE SERPL-SCNC: 112 MMOL/L (ref 96–112)
CHOLEST SERPL-MCNC: 133 MG/DL (ref 100–199)
CO2 SERPL-SCNC: 22 MMOL/L (ref 20–33)
CREAT SERPL-MCNC: 1.16 MG/DL (ref 0.5–1.4)
ERYTHROCYTE [SEDIMENTATION RATE] IN BLOOD BY WESTERGREN METHOD: 4 MM/HOUR (ref 0–15)
GLOBULIN SER CALC-MCNC: 2.8 G/DL (ref 1.9–3.5)
GLUCOSE SERPL-MCNC: 108 MG/DL (ref 65–99)
HDLC SERPL-MCNC: 44 MG/DL
LDLC SERPL CALC-MCNC: 72 MG/DL
POTASSIUM SERPL-SCNC: 4.1 MMOL/L (ref 3.6–5.5)
PROT SERPL-MCNC: 6.5 G/DL (ref 6–8.2)
SODIUM SERPL-SCNC: 139 MMOL/L (ref 135–145)
TRIGL SERPL-MCNC: 85 MG/DL (ref 0–149)

## 2018-07-12 PROCEDURE — 36415 COLL VENOUS BLD VENIPUNCTURE: CPT

## 2018-07-12 PROCEDURE — G0378 HOSPITAL OBSERVATION PER HR: HCPCS

## 2018-07-12 PROCEDURE — 80053 COMPREHEN METABOLIC PANEL: CPT

## 2018-07-12 PROCEDURE — 700111 HCHG RX REV CODE 636 W/ 250 OVERRIDE (IP): Performed by: INTERNAL MEDICINE

## 2018-07-12 PROCEDURE — 96372 THER/PROPH/DIAG INJ SC/IM: CPT

## 2018-07-12 PROCEDURE — A9270 NON-COVERED ITEM OR SERVICE: HCPCS | Performed by: INTERNAL MEDICINE

## 2018-07-12 PROCEDURE — 99217 PR OBSERVATION CARE DISCHARGE: CPT | Performed by: INTERNAL MEDICINE

## 2018-07-12 PROCEDURE — 85652 RBC SED RATE AUTOMATED: CPT

## 2018-07-12 PROCEDURE — 80061 LIPID PANEL: CPT

## 2018-07-12 PROCEDURE — 700102 HCHG RX REV CODE 250 W/ 637 OVERRIDE(OP): Performed by: INTERNAL MEDICINE

## 2018-07-12 RX ADMIN — ASPIRIN 325 MG: 325 TABLET ORAL at 05:01

## 2018-07-12 RX ADMIN — ENOXAPARIN SODIUM 40 MG: 100 INJECTION SUBCUTANEOUS at 05:01

## 2018-07-12 NOTE — THERAPY
OT consult received and acknowledged, RN reported pt is back to his baseline, I with ADLs and mobility. No acute OT needs identified. Will d/c orders    Koki Castaneda MS OTR/L, pager # [phone]

## 2018-07-12 NOTE — PROGRESS NOTES
Assist RN:  Contacted MRI regarding results.  Per MRI tech, result should be available around 1100.  Primary RN informed

## 2018-07-12 NOTE — PROGRESS NOTES
Pt discharged home as ordered by erp. Pt instructed to follow up with his PCP and cardiology at home. Pt reporting he has an appointment tomorrow. Pt left ambulating independently with a friend. Pt given RX.

## 2018-07-12 NOTE — PROGRESS NOTES
Pt asking to leave. Pt also requesting to speak with Hospitalist. Hospitalist to bedside to update pt.

## 2018-07-12 NOTE — THERAPY
Nsg reports no PT needs at this time, patient is INDEP with mobility, no deficits noted. Will d/c from acute PT.

## 2018-07-12 NOTE — DISCHARGE INSTRUCTIONS
Discharge Instructions    Discharged to home by car with friend. Discharged via walking, hospital escort: Refused.  Special equipment needed: Not Applicable    Be sure to schedule a follow-up appointment with your primary care doctor or any specialists as instructed.     Discharge Plan:   Diet Plan: Discussed  Activity Level: Discussed  Confirmed Follow up Appointment: Patient to Call and Schedule Appointment  Confirmed Symptoms Management: Discussed  Medication Reconciliation Updated: Yes  Influenza Vaccine Indication: Not indicated: Previously immunized this influenza season and > 8 years of age    I understand that a diet low in cholesterol, fat, and sodium is recommended for good health. Unless I have been given specific instructions below for another diet, I accept this instruction as my diet prescription.   Other diet: Regular    Special Instructions: None    · Is patient discharged on Warfarin / Coumadin?   No         Depression / Suicide Risk    As you are discharged from this [facility] facility, it is important to learn how to keep safe from harming yourself.    Recognize the warning signs:  · Abrupt changes in personality, positive or negative- including increase in energy   · Giving away possessions  · Change in eating patterns- significant weight changes-  positive or negative  · Change in sleeping patterns- unable to sleep or sleeping all the time   · Unwillingness or inability to communicate  · Depression  · Unusual sadness, discouragement and loneliness  · Talk of wanting to die  · Neglect of personal appearance   · Rebelliousness- reckless behavior  · Withdrawal from people/activities they love  · Confusion- inability to concentrate     If you or a loved one observes any of these behaviors or has concerns about self-harm, here's what you can do:  · Talk about it- your feelings and reasons for harming yourself  · Remove any means that you might use to hurt yourself (examples: pills, rope,  extension cords, firearm)  · Get professional help from the community (Mental Health, Substance Abuse, psychological counseling)  · Do not be alone:Call your Safe Contact- someone whom you trust who will be there for you.  · Call your local CRISIS HOTLINE 078-2804 or 644-414-2269  · Call your local Children's Mobile Crisis Response Team [place] (781) 748-7816 or www.[employer]  · Call the toll free National Suicide Prevention Hotlines   · National Suicide Prevention Lifeline 478-134-HTBN (6882)  · National Hope Line Network 800-SUICIDE (308-0708)

## 2018-07-12 NOTE — PROGRESS NOTES
IS given, pt. Instructed to use ever hour Wa, asked to demonstrate, pt able to pull 4L , educated on importance to  prevent pneumonia while in the hospital

## 2018-07-12 NOTE — DISCHARGE SUMMARY
Discharge Summary    CHIEF COMPLAINT ON ADMISSION  Chief Complaint   Patient presents with   • Anxiety     pt. states he came in yesterday for chest pain. Pt. states yesterday he was removed from a flight for MI like symptoms. Pt. states he got on a plane again this morning and again removed himself from the plane due to similar s/s. Pt. states he feels like his heart is racing. Pt's HR is in the 90s consistently.       Reason for Admission  Dizziness     Admission Date  7/11/2018    CTA:  1.  No evidence of thoracic or abdominal aortic aneurysm or dissection.    2.  Right-sided aortic arch, a normal variant.    3.  No acute inflammatory process identified in the chest or abdomen  CODE STATUS  Full Code    HPI & HOSPITAL COURSE  This is a 43 y.o. male here with chest pain.  Please refer to H&P for detail.  He said that he has been having left facial weakness/tingling sensation in the past and see neurology as outpatient for it.  Also he see cardiology in [place] area where he was found to have high cholesterol and plan to have Echo as part of regular work up.  He has history of aortic arch ring repairs in 2014.  His initial troponin were negative.  But he was found to have abnormal EKG with diffuse ST depression at lead I, V2-V6.  We do not have his old EKG to compare.    Because of his chest pain CTA study was done with no abnormal finding.  He also had left facial weakness tingling sensation in left arm tingling sensation.  It is intermittent in nature.  He has CT scan of the head done with no acute finding.  MRI of the brain was done and it was normal.  Due to his abnormal EKG I recommended to have stress echocardiogram for the patient.  He and his father were traveling from [facility] and they are planning to drive back to [place] today.  He said that he has appointment with his cardiology for further cardiac workup including EKG and echocardiogram and he wishes to have these tests done at [facility] rather than here at  [facility]. He was explained the risk of leaving without the test done and they understood it. They continue to insist to be discharged today since they are planning to drive back home.  Currently he is chest pain-free and insisted that he be discharged as soon as possible.  He was instructed to go back to near the hospital if his chest pain recur.  I discussed with cardiology Dr. Jasso for advice about him and he said he can go home and follow up with his cardiology in [place] as outpatient.       Therefore, he is discharged in fair and stable condition to home with close outpatient follow-up.        Discharge Date  7/12/18    FOLLOW UP ITEMS POST DISCHARGE      DISCHARGE DIAGNOSES  Active Problems:    Chest pain POA: Yes    Facial weakness POA: Yes  Resolved Problems:    * No resolved hospital problems. *      FOLLOW UP  No future appointments.  Pcp Not In Computer    In 1 week    cardiology tomorrow 7/13/18    MEDICATIONS ON DISCHARGE     Medication List      CONTINUE taking these medications      Instructions   DYMISTA 137-50 MCG/ACT Susp  Generic drug:  Azelastine-Fluticasone   Spray 1 Spray in nose every day.  Dose:  1 Spray            Allergies  Allergies   Allergen Reactions   • Crestor [Rosuvastatin Calcium]    • Lipitor [Atorvastatin]        DIET  Orders Placed This Encounter   Procedures   • Diet Order Regular     Standing Status:   Standing     Number of Occurrences:   1     Order Specific Question:   Diet:     Answer:   Regular [1]       ACTIVITY  As tolerated.  Weight bearing as tolerated    CONSULTATIONS      PROCEDURES      LABORATORY  Lab Results   Component Value Date    SODIUM 139 07/12/2018    POTASSIUM 4.1 07/12/2018    CHLORIDE 112 07/12/2018    CO2 22 07/12/2018    GLUCOSE 108 (H) 07/12/2018    BUN 17 07/12/2018    CREATININE 1.16 07/12/2018        Lab Results   Component Value Date    WBC 6.4 07/11/2018    HEMOGLOBIN 14.7 07/11/2018    HEMATOCRIT 42.8 07/11/2018    PLATELETCT 210 07/11/2018         Total time of the discharge process exceeds 40 minutes.

## 2018-07-12 NOTE — PROGRESS NOTES
Report received from ER RN, assumed patient care.  Pt A&OX4.  Assessment completed.  Call light within reach, personal belongings available, bed in lowest position, pt calling for assistance.  Pt reports no pain.  +n, -t.  Numbness present to left check extending to behind left ear.  Dysphagia swallowing eval complete, diet order already ordered.   Communication board updated, POC discussed.  Monitors applied, VSS.  No additional needs at this time.

## 2018-07-12 NOTE — PROGRESS NOTES
A/o,respirations are even and unlabored on room air ,assessment completed, vital signs stable, pt denies any numbness at the moment, no complaints of pain, SR on the monitor, IV fluids running per orders, updated communication board,  poc discussed and understood, verbalized understanding, box meal given,  neurocheck done- no deficit, all questions answered at this time,  fall precautions in place, call button within reach, will continue to monitor